# Patient Record
Sex: FEMALE | Race: WHITE | URBAN - METROPOLITAN AREA
[De-identification: names, ages, dates, MRNs, and addresses within clinical notes are randomized per-mention and may not be internally consistent; named-entity substitution may affect disease eponyms.]

---

## 2020-02-11 ENCOUNTER — OFFICE VISIT (OUTPATIENT)
Dept: SURGERY | Age: 45
End: 2020-02-11

## 2020-02-11 VITALS
HEART RATE: 67 BPM | DIASTOLIC BLOOD PRESSURE: 62 MMHG | BODY MASS INDEX: 26.52 KG/M2 | SYSTOLIC BLOOD PRESSURE: 109 MMHG | HEIGHT: 66 IN | WEIGHT: 165 LBS

## 2020-02-11 DIAGNOSIS — R92.8 ABNORMAL ULTRASOUND OF BREAST: ICD-10-CM

## 2020-02-11 DIAGNOSIS — R92.8 ABNORMAL MAMMOGRAM: Primary | ICD-10-CM

## 2020-02-11 NOTE — PROGRESS NOTES
HISTORY OF PRESENT ILLNESS  Ai Jacome is a 38 y.o. female. HPI    NEW patient presents for consultation at the request of Dr. Kanika Stanton for RIGHT axillary pain that she has felt off/on for one year. When she looks at the area, it sometimes looks as though she has a \"pimple\" there. She had a screening mammogram at [facility], where some complicated cysts were seen in the LEFT breast.  Cyst aspiration/biopsy is recommended of the cysts in the LEFT breast, and this is scheduled for 3/12/2020. She had RIGHT shoulder pain, relieved with an injection. She now has LEFT shoulder pain  FH - there is no FH of breast or ovarian cancer. OB History    No obstetric history on file. Obstetric Comments   Menarche 14, LMP 2/10/2020, # of children 11, age of 4st delivery 26, Hysterectomy/oophorectomy No/No, Breast bx No, history of breast feeding Yes, BCP Yes, in the past, Hormone therapy No           BILATERAL screening mammogram 1/27/2020 at [address] [place], BIRADS 4, suspicious. Ultrasound  1. Complex cyst 10mm LEFT 4:00 4cfn. Seen on mammo  2. Complex cyst LEFT 2:00 4cfn. Not seen on mammo  3. Complex cyst RIGHT 4mm 9:00. Benign  4. Stable RIGHT intramammary lymph node, benign      Review of Systems   Constitutional: Negative. HENT: Negative. Eyes: Negative. Respiratory: Negative. Cardiovascular: Negative. Gastrointestinal: Negative. Genitourinary: Negative. Musculoskeletal: Negative. Skin: Negative. Neurological: Negative. Endo/Heme/Allergies: Negative. Psychiatric/Behavioral: Negative. Physical Exam  Chest:      Breasts: Breasts are symmetrical.         Right: No inverted nipple, mass, nipple discharge, skin change or tenderness. Left: No inverted nipple, mass, nipple discharge, skin change or tenderness. Lymphadenopathy:      Upper Body:      Right upper body: No supraclavicular or axillary adenopathy.       Left upper body: No supraclavicular or axillary adenopathy. ASPIRATION OF BREAST CYST  Indication :  Ultrasound report-  Left 4:00 complex cyst, 2:00 complex cyst  Ultrasound Findings: multiple simple cysts bilateral breasts. RIGHT axilla - normal benign 7mm axillary lymph node. No abnormal mass or shadowing. Prep : Alcohol. Anesthesia : Lidocaine with epinephrine, 3 cc  Guidance : Ultrasound guidance. Yield :  1/2cc clear fluid was aspirated from 2 cysts with an 18 gauge needle. Effect : 2 Cysts completely aspirated. Pt has multiple simple cysts   Tolerance: Pt tolerated the procedure with minor discomfort  Diposition:  Follow up if new mass occurs  ASSESSMENT and PLAN    ICD-10-CM ICD-9-CM    1. Abnormal mammogram R92.8 793.80    2. Abnormal ultrasound of breast R92.8 793.89      LEFT breast cysts aspirated  No abnormality in the RIGHT axilla  Pt has her films. Continue annual screening mammograms.

## 2020-02-11 NOTE — LETTER
2/11/20 Patient: Lorenza Franks YOB: 1975 Date of Visit: 2/11/2020 Jurgen Blanco MD 
[address] [facility] [address] VIA Facsimile: [phone] Burak Milian MD 
[address] [place] [id number] VIA Facsimile: [phone] Dear MD Burak Mix MD, Thank you for referring Ms. Ai Jacome to [address] [place] [address] for evaluation. My notes for this consultation are attached. If you have questions, please do not hesitate to call me. I look forward to following your patient along with you.  
 
 
Sincerely, 
 
Anali Tam MD